# Patient Record
Sex: MALE | Race: WHITE | NOT HISPANIC OR LATINO | URBAN - METROPOLITAN AREA
[De-identification: names, ages, dates, MRNs, and addresses within clinical notes are randomized per-mention and may not be internally consistent; named-entity substitution may affect disease eponyms.]

---

## 2018-04-27 ENCOUNTER — OFFICE (OUTPATIENT)
Dept: URBAN - METROPOLITAN AREA CLINIC 75 | Facility: CLINIC | Age: 48
End: 2018-04-27

## 2018-04-27 VITALS
SYSTOLIC BLOOD PRESSURE: 126 MMHG | DIASTOLIC BLOOD PRESSURE: 84 MMHG | WEIGHT: 183 LBS | HEIGHT: 70 IN | HEART RATE: 84 BPM

## 2018-04-27 DIAGNOSIS — R13.10 DYSPHAGIA, UNSPECIFIED: ICD-10-CM

## 2018-04-27 PROCEDURE — 99203 OFFICE O/P NEW LOW 30 MIN: CPT

## 2020-04-06 ENCOUNTER — OFFICE VISIT CONVERTED (OUTPATIENT)
Dept: ORTHOPEDIC SURGERY | Facility: CLINIC | Age: 50
End: 2020-04-06
Attending: ORTHOPAEDIC SURGERY

## 2020-04-17 ENCOUNTER — HOSPITAL ENCOUNTER (OUTPATIENT)
Dept: GENERAL RADIOLOGY | Facility: HOSPITAL | Age: 50
Discharge: HOME | End: 2020-04-17
Attending: ORTHOPAEDIC SURGERY

## 2020-04-22 ENCOUNTER — OFFICE VISIT CONVERTED (OUTPATIENT)
Dept: ORTHOPEDIC SURGERY | Facility: CLINIC | Age: 50
End: 2020-04-22
Attending: ORTHOPAEDIC SURGERY

## 2021-05-10 NOTE — H&P
"   History and Physical      Patient Name: Syed Cho   Patient ID: [id number]   Sex: Male   YOB: 1970        Visit Date: April 6, 2020    Provider: Branden Funk MD   Location: [facility]   Location Address: [address] [place] [address]  [id number]   Location Phone: [phone]          Chief Complaint  · Right Knee Pain      History Of Present Illness  Syed Cho is a 50 year old male who presents today to [facility].      He's here for evaluation of right knee pain that started January 2020. Patient points to medial and lateral joint lines as main sources of pain. Patient complains of right knee instability. Patient states history of right knee partial ACL tear 13+ years ago. Patient states he has been bracing the right knee. He works at [employer]       Past Medical History  Limb Swelling; Seasonal allergies         Allergy List  SULFA (SULFONAMIDES)         Family Medical History  Cancer, Unspecified         Social History  Alcohol Use (Current some day); lives with children; lives with spouse; .; Recreational Drug Use (Never); Tobacco (Never); Working         Review of Systems  · Constitutional  o Denies  o : fever, chills, weight loss  · Cardiovascular  o Denies  o : chest pain, shortness of breath  · Gastrointestinal  o Denies  o : liver disease, heartburn, nausea, blood in stools  · Genitourinary  o Denies  o : painful urination, blood in urine  · Integument  o Denies  o : rash, itching  · Neurologic  o Denies  o : headache, weakness, loss of consciousness  · Musculoskeletal  o Admits  o : painful, swollen joints  · Psychiatric  o Denies  o : drug/alcohol addiction, anxiety, depression      Vitals  Date Time BP Position Site L\R Cuff Size HR RR TEMP (F) WT  HT  BMI kg/m2 BSA m2 O2 Sat HC       04/06/2020 01:56 PM         194lbs 16oz 5'  10\" 27.98 2.09           Physical Examination  · Constitutional  o Appearance  o : well developed, well-nourished, no " obvious deformities present  · Head and Face  o Head  o :   § Inspection  § : normocephalic  o Face  o :   § Inspection  § : no facial lesions  · Eyes  o Conjunctivae  o : conjunctivae normal  o Sclerae  o : sclerae white  · Ears, Nose, Mouth and Throat  o Ears  o :   § External Ears  § : appearance within normal limits  § Hearing  § : intact  o Nose  o :   § External Nose  § : appearance normal  · Neck  o Inspection/Palpation  o : normal appearance  o Range of Motion  o : full range of motion  · Respiratory  o Respiratory Effort  o : breathing unlabored  o Inspection of Chest  o : normal appearance  o Auscultation of Lungs  o : no audible wheezing or rales  · Cardiovascular  o Heart  o : regular rate  · Gastrointestinal  o Abdominal Examination  o : soft and non-tender  · Skin and Subcutaneous Tissue  o General Inspection  o : intact, no rashes  · Psychiatric  o General  o : Alert and oriented x3  o Judgement and Insight  o : judgment and insight intact  o Mood and Affect  o : mood normal, affect appropriate  · Right Knee  o Inspection  o : Full weight-bearing. Mild genu varus alignment. Mild effusion. No skin discoloration, or atrophy. Full knee range of motion. Mild medial and lateral joint line tenderness. Minimal laxity with Lachman. Negative Posterior Sag. Stable to valgus/varus stress. Good strength of quadriceps, hamstrings, dorsiflexors, and plantar flexors. Neurovascularly intact. Sensation grossly intact. Calf supple; non-tender.   · In Office Procedures  o View  o : LAT/SUNRISE/STANDING   o Site  o : right, knee  o Indication  o : Right knee pain  o Study  o : X-rays ordered, taken in the office, and reviewed today.  o Xray  o : Negative for fracture or dislocation; Minimal osteoarthritic changes.   o Comparative Data  o : No comparative data found          Assessment  · Right knee pain, unspecified chronicity     719.46/M25.561  · Knee effusion, right     719.06/M25.461      Plan  · Orders  o Knee  (Right) [facility] Preferred View ([id number]) - 719.46/M25.561 - 04/06/2020  · Medications  o Medications have been Reconciled  o Transition of Care or Provider Policy  · Instructions  o Reviewed the patient's Past Medical, Social, and Family history as well as the ROS at today's visit, no changes.  o Call or return if worsening symptoms.  o The above service was scribed by Carmencita Onofre on my behalf and I attest to the accuracy of the note. miles  o The plan is a right knee MRI to check for internal derangement. Follow-up after MRI.            Electronically Signed by: Elizabeth Onofre - , Other -Author on April 7, 2020 02:07:35 PM  Electronically Co-signed by: Branden Funk MD -Reviewer on April 8, 2020 04:33:56 PM

## 2021-05-12 NOTE — PROGRESS NOTES
"   Progress Note      Patient Name: Syed Cho   Patient ID: [id number]   Sex: Male   YOB: 1970    Referring Provider: Branden Funk MD    Visit Date: April 22, 2020    Provider: Branden Funk MD   Location: [facility]   Location Address: [address] [place] [address]  [id number]   Location Phone: [phone]          Chief Complaint  · Right Knee Pain-MRI Results      History Of Present Illness  Syed Cho is a 50 year old male who presents today to [facility].      [facility] following up for right knee pain and MRI results. Patient states right knee pain started in January 2020 after prolonged squatting while working on a vehicle. Patient complains of right knee pain, swelling, popping, and locking. Patient states history of right knee partial ACL tear 13+ years ago. He works at [employer]       Past Medical History  Limb Swelling; Seasonal allergies         Medication List  diclofenac sodium 75 mg oral tablet,delayed release (DR/EC)         Allergy List  SULFA (SULFONAMIDES)         Family Medical History  Cancer, Unspecified         Social History  Alcohol Use (Current some day); lives with children; lives with spouse; .; Recreational Drug Use (Never); Tobacco (Never); Working         Review of Systems  · Constitutional  o Denies  o : fever, chills, weight loss  · Cardiovascular  o Denies  o : chest pain, shortness of breath  · Gastrointestinal  o Denies  o : liver disease, heartburn, nausea, blood in stools  · Genitourinary  o Denies  o : painful urination, blood in urine  · Integument  o Denies  o : rash, itching  · Neurologic  o Denies  o : headache, weakness, loss of consciousness  · Musculoskeletal  o Admits  o : painful, swollen joints  · Psychiatric  o Denies  o : drug/alcohol addiction, anxiety, depression      Vitals  Date Time BP Position Site L\R Cuff Size HR RR TEMP (F) WT  HT  BMI kg/m2 BSA m2 O2 Sat HC       04/22/2020 08:09 AM         194lbs 16oz 5'  10\" " 27.98 2.09           Physical Examination  · Constitutional  o Appearance  o : well developed, well-nourished, no obvious deformities present  · Head and Face  o Head  o :   § Inspection  § : normocephalic  o Face  o :   § Inspection  § : no facial lesions  · Eyes  o Conjunctivae  o : conjunctivae normal  o Sclerae  o : sclerae white  · Ears, Nose, Mouth and Throat  o Ears  o :   § External Ears  § : appearance within normal limits  § Hearing  § : intact  o Nose  o :   § External Nose  § : appearance normal  · Neck  o Inspection/Palpation  o : normal appearance  o Range of Motion  o : full range of motion  · Respiratory  o Respiratory Effort  o : breathing unlabored  o Inspection of Chest  o : normal appearance  o Auscultation of Lungs  o : no audible wheezing or rales  · Cardiovascular  o Heart  o : regular rate  · Gastrointestinal  o Abdominal Examination  o : soft and non-tender  · Skin and Subcutaneous Tissue  o General Inspection  o : intact, no rashes  · Psychiatric  o General  o : Alert and oriented x3  o Judgement and Insight  o : judgment and insight intact  o Mood and Affect  o : mood normal, affect appropriate  · Right Knee  o Inspection  o : Full weight-bearing. Mild genu varus alignment. Mild effusion. No skin discoloration, or atrophy. Full knee range of motion. Mild medial and lateral joint line tenderness. Minimal laxity with Lachman. Negative Posterior Sag. Stable to valgus/varus stress. Good strength of quadriceps, hamstrings, dorsiflexors, and plantar flexors. Neurovascularly intact. Sensation grossly intact. Calf supple; non-tender.   · Injection Note/Aspiration Note  o Site  o : right knee  o Procedure  o : Procedure: After educating the patient, patient gave consent for procedure. After using Chloraprep, the joint space was injected. The patient tolerated the procedure well.   o Medication  o : 80 mg of DepoMedrol with 9cc of 1% Lidocaine  · Imaging  o Imaging  o : Right knee MRI 4/17/2020  revealed 1) Small to moderate knee joint effusion. 2) Mild to moderate osteoarthritic changes. 3) 4.3 cm x 1.4 cm popliteal cyst.          Assessment  · Primary osteoarthritis of right knee, mild-to-moderate     715.16/M17.11  · Right knee pain, unspecified chronicity     719.46/M25.561      Plan  · Orders  o Depo-Medrol injection 80mg () - 715.16/M17.11 - 04/22/2020   Lot [id number] Exp 05 2021 [employer] Administered by MARY Funk MD  o Knee Intra-articular Injection without US Guidance [facility] ([id number]) - 715.16/M17.11 - 04/22/2020   Lot [id number] Exp 07 01 2021 [employer] Administered by MARY Funk MD  · Medications  o Medications have been Reconciled  o Transition of Care or Provider Policy  · Instructions  o Reviewed the patient's Past Medical, Social, and Family history as well as the ROS at today's visit, no changes.  o Call or return if worsening symptoms.  o Reviewed MRI.  o The above service was scribed by Carmencita Onofre on my behalf and I attest to the accuracy of the note. miles  o Discussed conservative management with patient. The plan is a right knee steroid injection and a Voltaren prescription. If failing to improve, follow-up in 6 weeks. May seek pre-approval for viscosupplementation.             Electronically Signed by: Elizabeth Onofre - , Other -Author on April 22, 2020 03:36:06 PM  Electronically Co-signed by: Branden Funk MD -Reviewer on April 29, 2020 09:06:30 AM

## 2021-05-15 VITALS — BODY MASS INDEX: 27.92 KG/M2 | WEIGHT: 195 LBS | HEIGHT: 70 IN

## 2022-07-05 ENCOUNTER — OFFICE VISIT (OUTPATIENT)
Dept: GASTROENTEROLOGY | Facility: CLINIC | Age: 52
End: 2022-07-05

## 2022-07-05 VITALS
HEART RATE: 72 BPM | HEIGHT: 70 IN | BODY MASS INDEX: 27.35 KG/M2 | TEMPERATURE: 96.8 F | WEIGHT: 191 LBS | SYSTOLIC BLOOD PRESSURE: 126 MMHG | DIASTOLIC BLOOD PRESSURE: 84 MMHG

## 2022-07-05 DIAGNOSIS — K21.9 GASTROESOPHAGEAL REFLUX DISEASE, UNSPECIFIED WHETHER ESOPHAGITIS PRESENT: ICD-10-CM

## 2022-07-05 DIAGNOSIS — R09.89 THROAT CLEARING: ICD-10-CM

## 2022-07-05 DIAGNOSIS — R14.2 BELCHING: ICD-10-CM

## 2022-07-05 DIAGNOSIS — Z12.11 ENCOUNTER FOR SCREENING FOR MALIGNANT NEOPLASM OF COLON: Primary | ICD-10-CM

## 2022-07-05 PROCEDURE — 99203 OFFICE O/P NEW LOW 30 MIN: CPT | Performed by: INTERNAL MEDICINE

## 2022-07-05 RX ORDER — PSEUDOEPHEDRINE HCL 120 MG/1
120 TABLET, FILM COATED, EXTENDED RELEASE ORAL AS NEEDED
COMMUNITY

## 2022-07-05 NOTE — PROGRESS NOTES
Chief Complaint   Patient presents with   • Belching    • Throat clearing      Syed Cho is a 52 y.o. male who presents with a history of throat clearing and belching  HPI     Patient 52-year-old male with history of chronic sinus issues presenting with chronic throat clearing and belching.  Patient denies heartburn no weight loss no jaspal dysphagia noted.  Several years ago did have some globus sensation evaluated by ENT with scopes x2 showing questionable thickened epiglottis and slow closing epiglottis.  Patient has not had any real improvement with Sudafed use.  Patient denies any nausea vomiting no melena or hematemesis noted.  Patient denies weight loss no change in diet or appetite.    History reviewed. No pertinent past medical history.    Current Outpatient Medications:   •  pseudoephedrine (SUDAFED) 120 MG 12 hr tablet, Take 120 mg by mouth As Needed for Congestion., Disp: , Rfl:   Allergies   Allergen Reactions   • Azithromycin Other (See Comments)     Causes thrush      Social History     Socioeconomic History   • Marital status:    Tobacco Use   • Smoking status: Never Smoker   • Smokeless tobacco: Never Used   Substance and Sexual Activity   • Alcohol use: Yes     Comment: seldom   • Drug use: Never     History reviewed. No pertinent family history.  Review of Systems   Constitutional: Negative.    HENT: Negative.    Eyes: Negative.    Respiratory: Negative.    Cardiovascular: Negative.    Gastrointestinal: Negative.    Endocrine: Negative.    Musculoskeletal: Negative.    Skin: Negative.    Allergic/Immunologic: Negative.    Hematological: Negative.      Vitals:    07/05/22 1051   BP: 126/84   Pulse: 72   Temp: 96.8 °F (36 °C)     Physical Exam  Vitals and nursing note reviewed.   Constitutional:       Appearance: Normal appearance. He is well-developed and normal weight.   HENT:      Head: Normocephalic and atraumatic.   Eyes:      General: No scleral icterus.     Conjunctiva/sclera:  Conjunctivae normal.      Pupils: Pupils are equal, round, and reactive to light.   Neck:      Trachea: No tracheal deviation.   Cardiovascular:      Rate and Rhythm: Normal rate and regular rhythm.      Heart sounds: No murmur heard.    No friction rub. No gallop.   Pulmonary:      Effort: Pulmonary effort is normal. No respiratory distress.      Breath sounds: Normal breath sounds. No wheezing or rales.   Abdominal:      General: Bowel sounds are normal. There is no distension.      Palpations: Abdomen is soft. There is no mass.      Tenderness: There is no abdominal tenderness. There is no guarding or rebound.   Musculoskeletal:         General: No swelling or tenderness. Normal range of motion.      Cervical back: Normal range of motion and neck supple. No rigidity.   Skin:     General: Skin is warm and dry.      Coloration: Skin is not jaundiced.      Findings: No rash.   Neurological:      General: No focal deficit present.      Mental Status: He is alert and oriented to person, place, and time.      Cranial Nerves: No cranial nerve deficit.   Psychiatric:         Behavior: Behavior normal.         Thought Content: Thought content normal.         Judgment: Judgment normal.       Diagnoses and all orders for this visit:    Encounter for screening for malignant neoplasm of colon  -     Case Request; Standing  -     Case Request    Gastroesophageal reflux disease, unspecified whether esophagitis present  -     Case Request; Standing  -     Case Request    Throat clearing    Belching    Other orders  -     pseudoephedrine (SUDAFED) 120 MG 12 hr tablet; Take 120 mg by mouth As Needed for Congestion.    Patient 52-year-old male with history of chronic sinus issues complaining of chronic throat clearing and belching going on for years.  Patient denies any typical heartburn or true dysphagia but always feels like there is something in the back of the throat he needs to clear.  While patient's sinus pressure does seem  to improve with Sudafed this does not improve his throat clearing.  Patient reports being seen by ENT in the past who did not see anything except for possibly a thickened epiglottis or a slowly closing epiglottis.  Patient has been on no therapy but symptoms continue.  Patient reports has never had EGD or colonoscopy though had ENT scopes performed x2.  Will arrange EGD and colonoscopy for screening and follow-up clinically.  If no findings on EGD would consider video swallow by speech to assess if this is early spillage is an issue.

## 2022-07-19 ENCOUNTER — TELEPHONE (OUTPATIENT)
Dept: GASTROENTEROLOGY | Facility: CLINIC | Age: 52
End: 2022-07-19

## 2022-08-29 NOTE — TELEPHONE ENCOUNTER
Caller: CIARA GREY    Relationship to patient: Emergency Contact    Best call back number: PATIENT PHONE NUMBER  [phone]    Chief complaint: CAN'T CLEAR THROAT AS IF THERE IS DRAINAGE       Type of visit: EGD/CS    Requested date: AS SOON AS POSSIBLE  IT HAS BEEN OVER 3-WEEKS THEY HAVE WAITED

## 2022-08-30 ENCOUNTER — TELEPHONE (OUTPATIENT)
Dept: GASTROENTEROLOGY | Facility: CLINIC | Age: 52
End: 2022-08-30

## 2022-08-30 NOTE — TELEPHONE ENCOUNTER
Caller: CIARA GREY    Relationship: Emergency Contact    Best call back number: [phone]    What is the best time to reach you: ANYTIME    Who are you requesting to speak with (clinical staff, provider,  specific staff member): SCHEDULING     Do you know the name of the person who called: LEE ANN     What was the call regarding: SCHEDULING PROCEDURE     Do you require a callback: YES

## 2022-08-31 NOTE — TELEPHONE ENCOUNTER
ANDREA Carpenter for COLONOSCOPY/EGD on 9/23/22 arrive at 830am.Prep instructions mailed to address on file.    Advised PT that [facility] will call with final time 24 hours before procedure. If they do not get a phone call then arrival time will stay the same as given on instructions.

## 2022-09-22 RX ORDER — ACETAMINOPHEN 500 MG
500 TABLET ORAL EVERY 6 HOURS PRN
COMMUNITY

## 2022-09-23 ENCOUNTER — ANESTHESIA EVENT (OUTPATIENT)
Dept: GASTROENTEROLOGY | Facility: HOSPITAL | Age: 52
End: 2022-09-23

## 2022-09-23 ENCOUNTER — ANESTHESIA (OUTPATIENT)
Dept: GASTROENTEROLOGY | Facility: HOSPITAL | Age: 52
End: 2022-09-23

## 2022-09-23 ENCOUNTER — HOSPITAL ENCOUNTER (OUTPATIENT)
Facility: HOSPITAL | Age: 52
Setting detail: HOSPITAL OUTPATIENT SURGERY
Discharge: HOME | End: 2022-09-23
Attending: INTERNAL MEDICINE | Admitting: INTERNAL MEDICINE

## 2022-09-23 VITALS
WEIGHT: 184 LBS | SYSTOLIC BLOOD PRESSURE: 102 MMHG | HEART RATE: 73 BPM | BODY MASS INDEX: 26.34 KG/M2 | DIASTOLIC BLOOD PRESSURE: 78 MMHG | RESPIRATION RATE: 16 BRPM | HEIGHT: 70 IN | OXYGEN SATURATION: 97 % | TEMPERATURE: 98.1 F

## 2022-09-23 DIAGNOSIS — Z12.11 ENCOUNTER FOR SCREENING FOR MALIGNANT NEOPLASM OF COLON: ICD-10-CM

## 2022-09-23 DIAGNOSIS — R09.89 THROAT CLEARING: ICD-10-CM

## 2022-09-23 DIAGNOSIS — K21.9 GASTROESOPHAGEAL REFLUX DISEASE, UNSPECIFIED WHETHER ESOPHAGITIS PRESENT: ICD-10-CM

## 2022-09-23 DIAGNOSIS — R14.2 BELCHING: Primary | ICD-10-CM

## 2022-09-23 DIAGNOSIS — K21.9 GASTROESOPHAGEAL REFLUX DISEASE WITHOUT ESOPHAGITIS: ICD-10-CM

## 2022-09-23 PROCEDURE — 25010000002 PROPOFOL 10 MG/ML EMULSION: Performed by: ANESTHESIOLOGY

## 2022-09-23 PROCEDURE — 45378 DIAGNOSTIC COLONOSCOPY: CPT | Performed by: INTERNAL MEDICINE

## 2022-09-23 PROCEDURE — 43239 EGD BIOPSY SINGLE/MULTIPLE: CPT | Performed by: INTERNAL MEDICINE

## 2022-09-23 PROCEDURE — 88305 TISSUE EXAM BY PATHOLOGIST: CPT | Performed by: INTERNAL MEDICINE

## 2022-09-23 PROCEDURE — S0260 H&P FOR SURGERY: HCPCS | Performed by: INTERNAL MEDICINE

## 2022-09-23 RX ORDER — PROPOFOL 10 MG/ML
VIAL (ML) INTRAVENOUS CONTINUOUS PRN
Status: DISCONTINUED | OUTPATIENT
Start: 2022-09-23 | End: 2022-09-23 | Stop reason: SURG

## 2022-09-23 RX ORDER — PROPOFOL 10 MG/ML
VIAL (ML) INTRAVENOUS AS NEEDED
Status: DISCONTINUED | OUTPATIENT
Start: 2022-09-23 | End: 2022-09-23 | Stop reason: SURG

## 2022-09-23 RX ORDER — SODIUM CHLORIDE, SODIUM LACTATE, POTASSIUM CHLORIDE, CALCIUM CHLORIDE 600; 310; 30; 20 MG/100ML; MG/100ML; MG/100ML; MG/100ML
1000 INJECTION, SOLUTION INTRAVENOUS CONTINUOUS
Status: DISCONTINUED | OUTPATIENT
Start: 2022-09-23 | End: 2022-09-23 | Stop reason: HOSPADM

## 2022-09-23 RX ORDER — LIDOCAINE HYDROCHLORIDE 20 MG/ML
INJECTION, SOLUTION INFILTRATION; PERINEURAL AS NEEDED
Status: DISCONTINUED | OUTPATIENT
Start: 2022-09-23 | End: 2022-09-23 | Stop reason: SURG

## 2022-09-23 RX ADMIN — SODIUM CHLORIDE, POTASSIUM CHLORIDE, SODIUM LACTATE AND CALCIUM CHLORIDE 1000 ML: 600; 310; 30; 20 INJECTION, SOLUTION INTRAVENOUS at 09:17

## 2022-09-23 RX ADMIN — PROPOFOL 150 MG: 10 INJECTION, EMULSION INTRAVENOUS at 10:09

## 2022-09-23 RX ADMIN — LIDOCAINE HYDROCHLORIDE 40 MG: 20 INJECTION, SOLUTION INFILTRATION; PERINEURAL at 10:09

## 2022-09-23 RX ADMIN — PROPOFOL 50 MG: 10 INJECTION, EMULSION INTRAVENOUS at 10:26

## 2022-09-23 RX ADMIN — Medication 250 MCG/KG/MIN: at 10:09

## 2022-09-23 NOTE — ANESTHESIA POSTPROCEDURE EVALUATION
Patient: Syed Cho    Procedure Summary     Date: 09/23/22 Room / Location:  [facility] /  [facility]    Anesthesia Start: 1005 Anesthesia Stop: 1036    Procedures:       COLONOSCOPY into cecum and TI (N/A )      ESOPHAGOGASTRODUODENOSCOPY with biopsies (N/A Esophagus) Diagnosis:       Encounter for screening for malignant neoplasm of colon      Gastroesophageal reflux disease, unspecified whether esophagitis present      Internal hemorrhoids      (Encounter for screening for malignant neoplasm of colon [Z12.11])      (Gastroesophageal reflux disease, unspecified whether esophagitis present [K21.9])    Surgeons: Bhargav Hernandez MD Provider: Bobby Bee MD    Anesthesia Type: MAC ASA Status: 2          Anesthesia Type: MAC    Vitals  Vitals Value Taken Time   /78 09/23/22 1056   Temp     Pulse 73 09/23/22 1056   Resp 16 09/23/22 1056   SpO2 97 % 09/23/22 1056           Post Anesthesia Care and Evaluation    Patient location during evaluation: bedside  Patient participation: complete - patient participated  Level of consciousness: awake  Pain management: adequate    Airway patency: patent  Anesthetic complications: No anesthetic complications  PONV Status: none  Cardiovascular status: acceptable  Respiratory status: acceptable  Hydration status: acceptable  Post Neuraxial Block status: Motor and sensory function returned to baseline

## 2022-09-23 NOTE — H&P
"[facility]  Pre Procedure History & Physical    Chief Complaint:   Throat clearing with globus and colon screening    Subjective     HPI:   Patient 82-year-old male with history of sinus issues complaining of chronic throat clearing and globus.  Patient with no history of abdominal pain or typical heartburn or reflux with ongoing symptoms.  Patient here for EGD but also has never had screening performed due for colonoscopy.    Past Medical History:   Past Medical History:   Diagnosis Date   • GERD (gastroesophageal reflux disease)        Past Surgical History:  Past Surgical History:   Procedure Laterality Date   • DENTAL PROCEDURE         Family History:  Family History   Problem Relation Age of Onset   • Malig Hyperthermia Neg Hx        Social History:   reports that he has never smoked. He has never used smokeless tobacco. He reports current alcohol use. He reports that he does not use drugs.    Medications:   Medications Prior to Admission   Medication Sig Dispense Refill Last Dose   • acetaminophen (TYLENOL) 500 MG tablet Take 500 mg by mouth Every 6 (Six) Hours As Needed for Mild Pain.   9/22/2022 at Unknown time   • pseudoephedrine (SUDAFED) 120 MG 12 hr tablet Take 120 mg by mouth As Needed for Congestion.   Past Week at Unknown time       Allergies:  Azithromycin and Sulfa antibiotics    ROS:    Pertinent items are noted in HPI     Objective     Blood pressure 113/76, pulse 74, temperature 98.1 °F (36.7 °C), temperature source Oral, resp. rate 16, height 177.8 cm (70\"), weight 83.5 kg (184 lb), SpO2 98 %.    Physical Exam   Constitutional: Pt is oriented to person, place, and time and well-developed, well-nourished, and in no distress.   Mouth/Throat: Oropharynx is clear and moist.   Neck: Normal range of motion.   Cardiovascular: Normal rate, regular rhythm and normal heart sounds.    Pulmonary/Chest: Effort normal and breath sounds normal.   Abdominal: Soft. Nontender  Skin: Skin " is warm and dry.   Psychiatric: Mood, memory, affect and judgment normal.     Assessment & Plan     Diagnosis:  Throat clearing  History of globus  Colonoscopy screening    Anticipated Surgical Procedure:  EGD and colonoscopy    The risks, benefits, and alternatives of this procedure have been discussed with the patient or the responsible party- the patient understands and agrees to proceed.

## 2022-09-23 NOTE — DISCHARGE INSTRUCTIONS

## 2022-09-23 NOTE — ANESTHESIA PREPROCEDURE EVALUATION
Anesthesia Evaluation     Patient summary reviewed and Nursing notes reviewed   NPO Solid Status: > 8 hours  NPO Liquid Status: > 2 hours           Airway   Mallampati: I  TM distance: >3 FB  Neck ROM: full  No difficulty expected  Dental - normal exam     Pulmonary - negative pulmonary ROS and normal exam   Cardiovascular - negative cardio ROS and normal exam  Exercise tolerance: good (4-7 METS)        Neuro/Psych- negative ROS  GI/Hepatic/Renal/Endo    (+)  GERD,      Musculoskeletal (-) negative ROS    Abdominal  - normal exam    Bowel sounds: normal.   Substance History - negative use     OB/GYN negative ob/gyn ROS         Other                        Anesthesia Plan    ASA 2     MAC     intravenous induction     Anesthetic plan, risks, benefits, and alternatives have been provided, discussed and informed consent has been obtained with: patient.  Pre-procedure education provided      CODE STATUS:

## 2022-09-26 LAB
LAB AP CASE REPORT: NORMAL
PATH REPORT.FINAL DX SPEC: NORMAL
PATH REPORT.GROSS SPEC: NORMAL

## 2022-10-11 ENCOUNTER — TELEPHONE (OUTPATIENT)
Dept: GASTROENTEROLOGY | Facility: CLINIC | Age: 52
End: 2022-10-11

## 2022-10-11 NOTE — TELEPHONE ENCOUNTER
----- Message from Bhargav Hernandez MD sent at 10/9/2022  8:46 PM EDT -----  GAstritis on biopsies, continue ppi, if not improved can change ppi and f/u clinically  ----- Message -----  From: Lab, Background User  Sent: 9/26/2022  10:56 AM EDT  To: Bhargav Hernandez MD

## 2022-10-11 NOTE — TELEPHONE ENCOUNTER
Caller: Syed Cho    Relationship to patient: Self    Best call back number: [phone]    Patient is needing:PT RETURNED CALL TO TALK ABOUT TEST RESULTS

## 2022-10-12 NOTE — TELEPHONE ENCOUNTER
Called pt and advised of Dr. Goldberg note.  Pt verbalized understanding.     Pt is not on any PPI.  Msg sent to Dr Hernandez.      Colonoscopy placed in recall for 9/23/32 per post procedure note.

## 2022-10-24 RX ORDER — PANTOPRAZOLE SODIUM 40 MG/1
40 TABLET, DELAYED RELEASE ORAL DAILY
Qty: 90 TABLET | Refills: 3 | Status: SHIPPED | OUTPATIENT
Start: 2022-10-24

## 2022-10-24 NOTE — TELEPHONE ENCOUNTER
Called pt and advised of Dr. Goldberg note.  Pt verbalized understanding.     Protonix e-scribed, sent to Dr Hernandez to cosign.

## 2022-10-24 NOTE — TELEPHONE ENCOUNTER
Bhargav Hernandez MD  [facility] Clinical 2 Pool  Caller: Unspecified (1 week ago)  Begin protonix 40 mg daily

## 2022-11-11 ENCOUNTER — HOSPITAL ENCOUNTER (OUTPATIENT)
Dept: GENERAL RADIOLOGY | Facility: HOSPITAL | Age: 52
Discharge: HOME | End: 2022-11-11
Admitting: INTERNAL MEDICINE

## 2022-11-11 DIAGNOSIS — R14.2 BELCHING: ICD-10-CM

## 2022-11-11 DIAGNOSIS — R09.89 THROAT CLEARING: ICD-10-CM

## 2022-11-11 DIAGNOSIS — K21.9 GASTROESOPHAGEAL REFLUX DISEASE WITHOUT ESOPHAGITIS: ICD-10-CM

## 2022-11-11 PROCEDURE — 92611 MOTION FLUOROSCOPY/SWALLOW: CPT

## 2022-11-11 PROCEDURE — 74230 X-RAY XM SWLNG FUNCJ C+: CPT

## 2022-11-11 RX ADMIN — BARIUM SULFATE 1 TEASPOON(S): 0.6 CREAM ORAL at 11:55

## 2022-11-11 RX ADMIN — BARIUM SULFATE 50 ML: 400 SUSPENSION ORAL at 11:55

## 2022-11-11 RX ADMIN — BARIUM SULFATE 55 ML: 0.81 POWDER, FOR SUSPENSION ORAL at 11:55

## 2022-11-11 NOTE — MBS/VFSS/FEES
outpatient - Speech Language Pathology   Swallow modified barium swallow study [facility]     Patient Name: Syed Cho  : 1970  MRN: [id number]  Today's Date: 2022               Admit Date: 2022    Visit Dx:     ICD-10-CM ICD-9-CM   1. Belching  R14.2 787.3   2. Throat clearing  R09.89 786.09   3. Gastroesophageal reflux disease without esophagitis  K21.9 530.81     Patient Active Problem List   Diagnosis   • Encounter for screening for malignant neoplasm of colon   • Gastroesophageal reflux disease   • Throat clearing   • Belching     Past Medical History:   Diagnosis Date   • GERD (gastroesophageal reflux disease)      Past Surgical History:   Procedure Laterality Date   • COLONOSCOPY N/A 2022    Procedure: COLONOSCOPY into cecum and TI;  Surgeon: Bhargav Hernandez MD;  Location: [facility] ENDOSCOPY;  Service: Gastroenterology;  Laterality: N/A;  pre- screening  post- hemorrhoids   • DENTAL PROCEDURE     • ENDOSCOPY N/A 2022    Procedure: ESOPHAGOGASTRODUODENOSCOPY with biopsies;  Surgeon: Bhargav Hernandez MD;  Location: [facility] ENDOSCOPY;  Service: Gastroenterology;  Laterality: N/A;  pre- throat clearing, globus  post- hiatal hernia     MODIFIED BARIUM SWALLOW STUDY: SPEECH PATHOLOGY REPORT        DATE OF SERVICE:  22    PERTINENT INFORMATION:  Mr. Cho is a 52year old male with complaint of dysphagia.    He was referred for an MBSS by Dr. Hernandez to rule out aspiration as well as to determine appropriate treatment plan for this patient.      PROCEDURE:    Mr. Cho was alert and cooperative.  The patient was viewed in lateral plane.  The following Ba consistencies were administered: Thin barium, nectar thick barium, barium paste, barium mixed applesauce, barium with cracker.  The following compensatory swallowing strategies were performed: Bolus modification, cyclic ingestion, chin tuck      RESULTS:    1.  Nectar thick liquid by cup.  Swallow completed.  2.  Thin  liquid by cup.  Swallow completed.  3.  Purée by spoon.  Swallow completed.  4.  Pudding by spoon.  Swallow completed.  5.  Solid consistency.  Chewing with swallow completed.  6.  Thin liquid by straw.  Swallow completed with transient laryngeal penetration occurring.  No cough.  7.  Thin liquid by cup with multiple sips taken.  Swallows completed with consistent transient laryngeal penetration.  No cough.  8.  Thin liquid by straw with chin tuck.  Swallow completed.  No penetration observed.        IMPRESSIONS:    Mr. Cho demonstrated mild oropharyngeal dysphagia characterized by mild swallow delay.  Transient laryngeal penetration was observed with thin liquids by straw and multiple cup sips.  No aspiration observed during the study.  No penetration observed with single controlled sips of thin liquid via cup or utilization of chin tuck.      FUNCTIONAL DEFICIT: Patient scored level 6 of 7 on Functional Communication Measures for swallowing indicating a 1-19% limitation in function for current status, goal status, and discharge status.      RECOMMENDATIONS:   1.  Diet: Regular solids, thin liquids  2.  Positioning: Fully upright for all p.o. intake, 30 minutes following  3.  Compensatory strategies: Controlled drink of thin liquids          Yes, patient/responsible party agrees with the plan of care and has been informed of all alternatives, risks and benefits.    Thank you for this referral.  SLP Recommendation and Plan                                                                                       EDUCATION  The patient has been educated in the following areas:   Dysphagia (Swallowing Impairment).              Time Calculation:       Therapy Charges for Today     Code Description Service Date Service Provider Modifiers Qty    29369353907 HC ST MOTION FLUORO EVAL SWALLOW 6 11/11/2022 Soraida David, SLP GN 1               FREYA Wagner  11/11/2022

## 2022-12-19 ENCOUNTER — TELEPHONE (OUTPATIENT)
Dept: GASTROENTEROLOGY | Facility: CLINIC | Age: 52
End: 2022-12-19

## 2022-12-19 NOTE — TELEPHONE ENCOUNTER
----- Message from Bhargav Hernandez MD sent at 11/27/2022  9:35 PM EST -----  Follow recommendation from speech

## 2023-01-17 ENCOUNTER — TELEPHONE (OUTPATIENT)
Dept: GASTROENTEROLOGY | Facility: CLINIC | Age: 53
End: 2023-01-17
Payer: COMMERCIAL

## 2023-01-17 NOTE — TELEPHONE ENCOUNTER
----- Message from Bhargva Hernandez MD sent at 11/27/2022  9:35 PM EST -----  Follow recommendation from speech

## 2024-10-15 ENCOUNTER — PATIENT ROUNDING (BHMG ONLY) (OUTPATIENT)
Dept: FAMILY MEDICINE CLINIC | Facility: CLINIC | Age: 54
End: 2024-10-15

## 2024-10-15 ENCOUNTER — OFFICE VISIT (OUTPATIENT)
Dept: FAMILY MEDICINE CLINIC | Facility: CLINIC | Age: 54
End: 2024-10-15
Payer: COMMERCIAL

## 2024-10-15 VITALS
HEART RATE: 81 BPM | WEIGHT: 178.4 LBS | BODY MASS INDEX: 25.54 KG/M2 | TEMPERATURE: 98 F | DIASTOLIC BLOOD PRESSURE: 80 MMHG | HEIGHT: 70 IN | SYSTOLIC BLOOD PRESSURE: 118 MMHG | OXYGEN SATURATION: 99 %

## 2024-10-15 DIAGNOSIS — Z12.5 PROSTATE CANCER SCREENING: ICD-10-CM

## 2024-10-15 DIAGNOSIS — Z00.00 ANNUAL PHYSICAL EXAM: Primary | ICD-10-CM

## 2024-10-15 DIAGNOSIS — R42 DIZZINESS: ICD-10-CM

## 2024-10-15 DIAGNOSIS — H61.23 BILATERAL IMPACTED CERUMEN: ICD-10-CM

## 2024-10-15 LAB
ALBUMIN SERPL-MCNC: 4.4 G/DL (ref 3.5–5.2)
ALBUMIN/GLOB SERPL: 1.4 G/DL
ALP SERPL-CCNC: 85 U/L (ref 39–117)
ALT SERPL W P-5'-P-CCNC: 48 U/L (ref 1–41)
ANION GAP SERPL CALCULATED.3IONS-SCNC: 6.7 MMOL/L (ref 5–15)
AST SERPL-CCNC: 40 U/L (ref 1–40)
BASOPHILS # BLD AUTO: 0.05 10*3/MM3 (ref 0–0.2)
BASOPHILS NFR BLD AUTO: 0.9 % (ref 0–1.5)
BILIRUB SERPL-MCNC: 0.5 MG/DL (ref 0–1.2)
BUN SERPL-MCNC: 11 MG/DL (ref 6–20)
BUN/CREAT SERPL: 9.4 (ref 7–25)
CALCIUM SPEC-SCNC: 10.3 MG/DL (ref 8.6–10.5)
CHLORIDE SERPL-SCNC: 103 MMOL/L (ref 98–107)
CHOLEST SERPL-MCNC: 226 MG/DL (ref 0–200)
CO2 SERPL-SCNC: 28.3 MMOL/L (ref 22–29)
CREAT SERPL-MCNC: 1.17 MG/DL (ref 0.76–1.27)
DEPRECATED RDW RBC AUTO: 41.4 FL (ref 37–54)
EGFRCR SERPLBLD CKD-EPI 2021: 75 ML/MIN/1.73
EOSINOPHIL # BLD AUTO: 0.11 10*3/MM3 (ref 0–0.4)
EOSINOPHIL NFR BLD AUTO: 2 % (ref 0.3–6.2)
ERYTHROCYTE [DISTWIDTH] IN BLOOD BY AUTOMATED COUNT: 12.5 % (ref 12.3–15.4)
GLOBULIN UR ELPH-MCNC: 3.2 GM/DL
GLUCOSE SERPL-MCNC: 91 MG/DL (ref 65–99)
HCT VFR BLD AUTO: 49 % (ref 37.5–51)
HCV AB SER QL: NORMAL
HDLC SERPL-MCNC: 52 MG/DL (ref 40–60)
HGB BLD-MCNC: 17.6 G/DL (ref 13–17.7)
IMM GRANULOCYTES # BLD AUTO: 0.01 10*3/MM3 (ref 0–0.05)
IMM GRANULOCYTES NFR BLD AUTO: 0.2 % (ref 0–0.5)
LDLC SERPL CALC-MCNC: 151 MG/DL (ref 0–100)
LDLC/HDLC SERPL: 2.85 {RATIO}
LYMPHOCYTES # BLD AUTO: 1.21 10*3/MM3 (ref 0.7–3.1)
LYMPHOCYTES NFR BLD AUTO: 21.8 % (ref 19.6–45.3)
MCH RBC QN AUTO: 32.9 PG (ref 26.6–33)
MCHC RBC AUTO-ENTMCNC: 35.9 G/DL (ref 31.5–35.7)
MCV RBC AUTO: 91.6 FL (ref 79–97)
MONOCYTES # BLD AUTO: 0.42 10*3/MM3 (ref 0.1–0.9)
MONOCYTES NFR BLD AUTO: 7.6 % (ref 5–12)
NEUTROPHILS NFR BLD AUTO: 3.74 10*3/MM3 (ref 1.7–7)
NEUTROPHILS NFR BLD AUTO: 67.5 % (ref 42.7–76)
NRBC BLD AUTO-RTO: 0 /100 WBC (ref 0–0.2)
PLATELET # BLD AUTO: 229 10*3/MM3 (ref 140–450)
PMV BLD AUTO: 11.6 FL (ref 6–12)
POTASSIUM SERPL-SCNC: 5.3 MMOL/L (ref 3.5–5.2)
PROT SERPL-MCNC: 7.6 G/DL (ref 6–8.5)
PSA SERPL-MCNC: 3.11 NG/ML (ref 0–4)
RBC # BLD AUTO: 5.35 10*6/MM3 (ref 4.14–5.8)
SODIUM SERPL-SCNC: 138 MMOL/L (ref 136–145)
TRIGL SERPL-MCNC: 129 MG/DL (ref 0–150)
TSH SERPL DL<=0.05 MIU/L-ACNC: 1.11 UIU/ML (ref 0.27–4.2)
VLDLC SERPL-MCNC: 23 MG/DL (ref 5–40)
WBC NRBC COR # BLD AUTO: 5.54 10*3/MM3 (ref 3.4–10.8)

## 2024-10-15 PROCEDURE — 99386 PREV VISIT NEW AGE 40-64: CPT | Performed by: FAMILY MEDICINE

## 2024-10-15 PROCEDURE — 86803 HEPATITIS C AB TEST: CPT | Performed by: FAMILY MEDICINE

## 2024-10-15 PROCEDURE — 80061 LIPID PANEL: CPT | Performed by: FAMILY MEDICINE

## 2024-10-15 PROCEDURE — 80050 GENERAL HEALTH PANEL: CPT | Performed by: FAMILY MEDICINE

## 2024-10-15 PROCEDURE — G0103 PSA SCREENING: HCPCS | Performed by: FAMILY MEDICINE

## 2024-10-15 NOTE — PROGRESS NOTES
..  Venipuncture Blood Specimen Collection  Venipuncture performed in LT arm by Shweta Stahl with good hemostasis. Patient tolerated the procedure well without complications.   10/15/24   Shreya Ramires MA

## 2024-10-15 NOTE — PROGRESS NOTES
"Chief Complaint  Annual Exam (Physical ) and Balance Issues (Recent balance issues x one month. )    Subjective      Syed Cho is a 54 y.o. male who presents to [facility]     Patient here to establish care.    PMH: GERD  SH: No tobacco, very rare alcohol use, no drug use    Here for annual exam.    Balance issues for the past month. Says he has dealt with dizziness off and on for a long time, usually due to sinus/inner ear problems, but it's been more frequent lately. Been dizzy daily currently. Bent down to  a pen earlier and says he's still a little dizzy from that. More of a lightheadedness than a room spinning. Never has passed out. No nausea or vomiting. No vision changes or problems. He took a pseudofed yesterday which helped it.     Both ears are very clogged today.  He has to wear earplugs all day for his job.    Had a colonoscopy 2 years ago - reports all looked good and to follow up in 10 years.     Objective   Vital Signs:   Vitals:    10/15/24 0811   BP: 118/80   Pulse: 81   Temp: 98 °F (36.7 °C)   SpO2: 99%   Weight: 80.9 kg (178 lb 6.4 oz)   Height: 177.2 cm (69.75\")     Body mass index is 25.78 kg/m².    Wt Readings from Last 3 Encounters:   10/15/24 80.9 kg (178 lb 6.4 oz)   09/23/22 83.5 kg (184 lb)   07/05/22 86.6 kg (191 lb)     BP Readings from Last 3 Encounters:   10/15/24 118/80   09/23/22 102/78   07/05/22 126/84       Health Maintenance   Topic Date Due    HEPATITIS C SCREENING  Never done    COVID-19 Vaccine (1 - 2023-24 season) 10/17/2024 (Originally 9/1/2024)    INFLUENZA VACCINE  03/31/2025 (Originally 8/1/2024)    ZOSTER VACCINE (1 of 2) 10/01/2026 (Originally 3/29/2022)    ANNUAL PHYSICAL  10/15/2025    BMI FOLLOWUP  10/15/2025    TDAP/TD VACCINES (2 - Td or Tdap) 10/01/2028    COLORECTAL CANCER SCREENING  09/23/2032    Pneumococcal Vaccine 0-64  Aged Out       Physical Exam  Vitals and nursing note reviewed.   Constitutional:       " General: He is not in acute distress.  HENT:      Right Ear: External ear normal. There is impacted cerumen.      Left Ear: External ear normal. There is impacted cerumen.   Cardiovascular:      Rate and Rhythm: Normal rate and regular rhythm.      Heart sounds: Normal heart sounds.   Pulmonary:      Effort: Pulmonary effort is normal. No respiratory distress.      Breath sounds: Normal breath sounds.   Neurological:      Mental Status: He is alert.   Psychiatric:         Mood and Affect: Mood normal.         Behavior: Behavior normal.          Result Review :  The following data was reviewed by: Timmy Pop MD on 10/15/2024:         Procedures          Assessment & Plan  Annual physical exam  Annual lab work ordered today.  Prostate cancer screening  PSA ordered today  Bilateral impacted cerumen  There are several things that can help with cerumen impaction (clogged earwax). Debrox drops 1-2x per day in each affected ear can help. Baby oil drops 1-2x per week in each affected ear can also help. Avoid using q-tips or placing anything in your ears as that can make the impaction worse.    Ears flushed in clinic today.  Significant amount of cerumen was able to be flushed from the ears.  He reported some dizziness during the procedure but a lot of relief afterwards.  Dizziness  Suspect that dizziness was secondary to impacted cerumen.  Follow-up if worsening or if no improvement.    Orders Placed This Encounter   Procedures    Comprehensive Metabolic Panel    Lipid Panel    TSH Rfx On Abnormal To Free T4    Hepatitis C Antibody    PSA Screen    CBC Auto Differential    CBC & Differential                      FOLLOW UP  Return in about 1 year (around 10/15/2025), or if symptoms worsen or fail to improve, for Annual physical.  Patient was given instructions and counseling regarding his condition or for health maintenance advice. Please see specific information pulled into the AVS if appropriate.       Timmy Xiao  MD Kiesha  10/15/24  08:52 EDT    CURRENT & DISCONTINUED MEDICATIONS  Current Outpatient Medications   Medication Instructions    pseudoephedrine (SUDAFED) 120 mg, Oral, As Needed       Medications Discontinued During This Encounter   Medication Reason    acetaminophen (TYLENOL) 500 MG tablet *Therapy completed    pantoprazole (Protonix) 40 MG EC tablet *Therapy completed

## 2024-10-15 NOTE — PROGRESS NOTES
My name is Nandini Maxwell      I am  with [facility]  [facility]  [address] [place] [address] [address] [place] [address] [phone].    I am calling to officially welcome you to our practice and ask about your recent visit.    Tell me about your visit with us. What things went well?       We're always looking for ways to make our patients' experiences even better. Do you have recommendations on ways we may improve?      Overall were you satisfied with your first visit to our practice?        I appreciate you taking the time to speak with me today. Is there anything else I can do for you?     Thank you, and have a great day.

## 2025-01-09 ENCOUNTER — OFFICE VISIT (OUTPATIENT)
Dept: FAMILY MEDICINE CLINIC | Facility: CLINIC | Age: 55
End: 2025-01-09
Payer: COMMERCIAL

## 2025-01-09 VITALS
BODY MASS INDEX: 26.24 KG/M2 | TEMPERATURE: 98.2 F | HEART RATE: 85 BPM | DIASTOLIC BLOOD PRESSURE: 70 MMHG | WEIGHT: 181.6 LBS | OXYGEN SATURATION: 99 % | SYSTOLIC BLOOD PRESSURE: 102 MMHG

## 2025-01-09 DIAGNOSIS — J01.00 ACUTE NON-RECURRENT MAXILLARY SINUSITIS: Primary | ICD-10-CM

## 2025-01-09 DIAGNOSIS — J30.9 ALLERGIC RHINITIS, UNSPECIFIED SEASONALITY, UNSPECIFIED TRIGGER: ICD-10-CM

## 2025-01-09 PROCEDURE — 99213 OFFICE O/P EST LOW 20 MIN: CPT | Performed by: FAMILY MEDICINE

## 2025-01-09 RX ORDER — CETIRIZINE HYDROCHLORIDE 10 MG/1
10 TABLET ORAL DAILY
Qty: 30 TABLET | Refills: 2 | Status: SHIPPED | OUTPATIENT
Start: 2025-01-09 | End: 2025-04-09

## 2025-01-09 NOTE — PROGRESS NOTES
Chief Complaint  Sinusitis (Sinus drainage )    Subjective      Syed Cho is a 54 y.o. male who presents to [facility]     Sinus drainage. Had flu like symptoms over Adiel and had about 4 days of fever, body aches then. But since then has had sinuses draining and dizzy feelings. Similar to how he felt in October when he had cerumen impaction. Mucus mostly clear but a little color to it. Was a trace of blood with neti pot this morning.     Objective   Vital Signs:   Vitals:    01/09/25 1013   BP: 102/70   Pulse: 85   Temp: 98.2 °F (36.8 °C)   SpO2: 99%   Weight: 82.4 kg (181 lb 9.6 oz)     Body mass index is 26.24 kg/m².    Wt Readings from Last 3 Encounters:   01/09/25 82.4 kg (181 lb 9.6 oz)   10/15/24 80.9 kg (178 lb 6.4 oz)   09/23/22 83.5 kg (184 lb)     BP Readings from Last 3 Encounters:   01/09/25 102/70   10/15/24 118/80   09/23/22 102/78       Health Maintenance   Topic Date Due    COVID-19 Vaccine (1 - 2024-25 season) Never done    INFLUENZA VACCINE  03/31/2025 (Originally 7/1/2024)    ZOSTER VACCINE (1 of 2) 10/01/2026 (Originally 3/29/2022)    ANNUAL PHYSICAL  10/15/2025    BMI FOLLOWUP  10/15/2025    TDAP/TD VACCINES (2 - Td or Tdap) 10/01/2028    COLORECTAL CANCER SCREENING  09/23/2032    HEPATITIS C SCREENING  Completed    Pneumococcal Vaccine 0-64  Aged Out       Physical Exam  Vitals and nursing note reviewed.   Constitutional:       General: He is not in acute distress.  HENT:      Right Ear: Tympanic membrane, ear canal and external ear normal. There is no impacted cerumen.      Left Ear: Tympanic membrane, ear canal and external ear normal. There is no impacted cerumen.      Nose: Congestion and rhinorrhea present.      Comments: Not really maxillary sinus tenderness but he endorsed some sinus pressure on palpation of his maxillary sinuses.     Mouth/Throat:      Mouth: Mucous membranes are moist.      Pharynx: Oropharynx is clear. No posterior  oropharyngeal erythema.   Cardiovascular:      Rate and Rhythm: Normal rate and regular rhythm.      Heart sounds: Normal heart sounds.   Pulmonary:      Effort: Pulmonary effort is normal. No respiratory distress.      Breath sounds: Normal breath sounds.   Neurological:      Mental Status: He is alert.   Psychiatric:         Mood and Affect: Mood normal.         Behavior: Behavior normal.          Result Review :  The following data was reviewed by: Timmy Pop MD on 01/09/2025:         Procedures          Assessment & Plan  Acute non-recurrent maxillary sinusitis  Likely diagnosis of sinusitis.  Discussed importance of staying hydrated and taking full course of antibiotics as prescribed.  Discussed that antibiotics can cause side effects including diarrhea.  Advised that taking a probiotic or eating yogurt can help prevent diarrhea while on antibiotics. If symptoms worsen or do not improve, recommend return to office for further workup.   Orders:    amoxicillin-clavulanate (AUGMENTIN) 875-125 MG per tablet; Take 1 tablet by mouth 2 (Two) Times a Day for 7 days.    Allergic rhinitis, unspecified seasonality, unspecified trigger    Orders:    cetirizine (zyrTEC) 10 MG tablet; Take 1 tablet by mouth Daily for 90 days.                   FOLLOW UP  Return if symptoms worsen or fail to improve.  Patient was given instructions and counseling regarding his condition or for health maintenance advice. Please see specific information pulled into the AVS if appropriate.       Timmy Pop MD  01/09/25  10:29 EST    CURRENT & DISCONTINUED MEDICATIONS  Current Outpatient Medications   Medication Instructions    amoxicillin-clavulanate (AUGMENTIN) 875-125 MG per tablet 1 tablet, Oral, 2 Times Daily    cetirizine (ZYRTEC) 10 mg, Oral, Daily    pseudoephedrine (SUDAFED) 120 mg, Oral, As Needed       There are no discontinued medications.

## (undated) DEVICE — SINGLE-USE BIOPSY FORCEPS: Brand: RADIAL JAW 4

## (undated) DEVICE — KT ORCA ORCAPOD DISP STRL

## (undated) DEVICE — BITEBLOCK OMNI BLOC

## (undated) DEVICE — LN SMPL CO2 SHTRM SD STREAM W/M LUER

## (undated) DEVICE — ADAPT CLN BIOGUARD AIR/H2O DISP

## (undated) DEVICE — TUBING, SUCTION, 1/4" X 10', STRAIGHT: Brand: MEDLINE

## (undated) DEVICE — CANN O2 ETCO2 FITS ALL CONN CO2 SMPL A/ 7IN DISP LF

## (undated) DEVICE — SENSR O2 OXIMAX FNGR A/ 18IN NONSTR